# Patient Record
Sex: MALE | Race: WHITE | NOT HISPANIC OR LATINO | Employment: FULL TIME | URBAN - METROPOLITAN AREA
[De-identification: names, ages, dates, MRNs, and addresses within clinical notes are randomized per-mention and may not be internally consistent; named-entity substitution may affect disease eponyms.]

---

## 2017-09-20 ENCOUNTER — HISTORICAL (OUTPATIENT)
Dept: LAB | Facility: HOSPITAL | Age: 37
End: 2017-09-20

## 2017-09-20 LAB — RHEUMATOID FACT SERPL-ACNC: <10 IU/ML (ref 0–15)

## 2017-12-26 LAB — RAPID GROUP A STREP (OHS): POSITIVE

## 2018-03-20 ENCOUNTER — HISTORICAL (OUTPATIENT)
Dept: ADMINISTRATIVE | Facility: HOSPITAL | Age: 38
End: 2018-03-20

## 2018-03-20 LAB
ABS NEUT (OLG): 3.6
ALBUMIN SERPL-MCNC: 5 GM/DL (ref 3.4–5)
ALBUMIN/GLOB SERPL: 2.19 {RATIO} (ref 1.5–2.5)
ALP SERPL-CCNC: 60 UNIT/L (ref 38–126)
ALT SERPL-CCNC: 53 UNIT/L (ref 7–52)
APPEARANCE, UA: CLEAR
AST SERPL-CCNC: 27 UNIT/L (ref 15–37)
BACTERIA #/AREA URNS AUTO: ABNORMAL /HPF
BILIRUB SERPL-MCNC: 0.9 MG/DL (ref 0.2–1)
BILIRUB UR QL STRIP: NEGATIVE MG/DL
BILIRUBIN DIRECT+TOT PNL SERPL-MCNC: 0.2 MG/DL (ref 0–0.5)
BUN SERPL-MCNC: 14 MG/DL (ref 7–18)
CALCIUM SERPL-MCNC: 9.6 MG/DL (ref 8.5–10)
CHLORIDE SERPL-SCNC: 105 MMOL/L (ref 98–107)
CHOLEST SERPL-MCNC: 185 MG/DL (ref 0–200)
CHOLEST/HDLC SERPL: 4.9 {RATIO}
CO2 SERPL-SCNC: 28 MMOL/L (ref 21–32)
COLOR UR: YELLOW
CREAT SERPL-MCNC: 0.87 MG/DL (ref 0.6–1.3)
CREAT/UREA NIT SERPL: 16.1
ERYTHROCYTE [DISTWIDTH] IN BLOOD BY AUTOMATED COUNT: 12.5 % (ref 11.5–17)
GGT SERPL-CCNC: 98 UNIT/L (ref 5–85)
GLOBULIN SER-MCNC: 2.3 GM/DL (ref 1.2–3)
GLUCOSE (UA): NEGATIVE MG/DL
GLUCOSE SERPL-MCNC: 93 MG/DL (ref 74–106)
HCT VFR BLD AUTO: 43.8 % (ref 42–52)
HDLC SERPL-MCNC: 38 MG/DL (ref 35–60)
HGB BLD-MCNC: 15.2 GM/DL (ref 14–18)
HGB UR QL STRIP: NEGATIVE UNIT/L
KETONES UR QL STRIP: NEGATIVE MG/DL
LDH SERPL-CCNC: 172 UNIT/L (ref 140–271)
LDLC SERPL CALC-MCNC: 112 MG/DL (ref 0–129)
LEUKOCYTE ESTERASE UR QL STRIP: NEGATIVE UNIT/L
LYMPHOCYTES # BLD AUTO: 2.7 X10(3)/MCL (ref 0.6–3.4)
LYMPHOCYTES NFR BLD AUTO: 38.8 % (ref 13–40)
MCH RBC QN AUTO: 30.5 PG (ref 27–31.2)
MCHC RBC AUTO-ENTMCNC: 35 GM/DL (ref 32–36)
MCV RBC AUTO: 88 FL (ref 80–94)
MONOCYTES # BLD AUTO: 0.6 X10(3)/MCL (ref 0–1.8)
MONOCYTES NFR BLD AUTO: 8.3 % (ref 0.1–24)
NEUTROPHILS NFR BLD AUTO: 52.9 % (ref 47–80)
NITRITE UR QL STRIP.AUTO: NEGATIVE
PH UR STRIP: 6 [PH]
PLATELET # BLD AUTO: 294 X10(3)/MCL (ref 130–400)
PMV BLD AUTO: 9.3 FL
POTASSIUM SERPL-SCNC: 4.9 MMOL/L (ref 3.5–5.1)
PROT SERPL-MCNC: 7.3 GM/DL (ref 6.4–8.2)
PROT UR QL STRIP: NEGATIVE MG/DL
RBC # BLD AUTO: 4.99 X10(6)/MCL (ref 4.7–6.1)
RBC #/AREA URNS HPF: ABNORMAL /HPF
SODIUM SERPL-SCNC: 139 MMOL/L (ref 136–145)
SP GR UR STRIP: >1.03
SQUAMOUS EPITHELIAL, UA: ABNORMAL /LPF
TRIGL SERPL-MCNC: 172 MG/DL (ref 30–150)
UROBILINOGEN UR STRIP-ACNC: 0.2 MG/DL
VLDLC SERPL CALC-MCNC: 34.4 MG/DL
WBC # SPEC AUTO: 6.9 X10(3)/MCL (ref 4.5–11.5)
WBC #/AREA URNS AUTO: ABNORMAL /[HPF]

## 2018-09-25 ENCOUNTER — HISTORICAL (OUTPATIENT)
Dept: ADMINISTRATIVE | Facility: HOSPITAL | Age: 38
End: 2018-09-25

## 2018-09-25 LAB
ABS NEUT (OLG): 3.6
ALBUMIN SERPL-MCNC: 4.6 GM/DL (ref 3.4–5)
ALBUMIN/GLOB SERPL: 1.84 {RATIO} (ref 1.5–2.5)
ALP SERPL-CCNC: 53 UNIT/L (ref 38–126)
ALT SERPL-CCNC: 41 UNIT/L (ref 7–52)
AST SERPL-CCNC: 18 UNIT/L (ref 15–37)
BILIRUB SERPL-MCNC: 0.4 MG/DL (ref 0.2–1)
BILIRUBIN DIRECT+TOT PNL SERPL-MCNC: 0.1 MG/DL (ref 0–0.5)
BILIRUBIN DIRECT+TOT PNL SERPL-MCNC: 0.3 MG/DL
BUN SERPL-MCNC: 16 MG/DL (ref 7–18)
CALCIUM SERPL-MCNC: 9.3 MG/DL (ref 8.5–10)
CHLORIDE SERPL-SCNC: 107 MMOL/L (ref 98–107)
CHOLEST SERPL-MCNC: 171 MG/DL (ref 0–200)
CHOLEST/HDLC SERPL: 4.8 {RATIO}
CO2 SERPL-SCNC: 33 MMOL/L (ref 21–32)
CREAT SERPL-MCNC: 0.88 MG/DL (ref 0.6–1.3)
ERYTHROCYTE [DISTWIDTH] IN BLOOD BY AUTOMATED COUNT: 12.2 % (ref 11.5–17)
GLOBULIN SER-MCNC: 2.5 GM/DL (ref 1.2–3)
GLUCOSE SERPL-MCNC: 105 MG/DL (ref 74–106)
HCT VFR BLD AUTO: 44.8 % (ref 42–52)
HDLC SERPL-MCNC: 36 MG/DL (ref 35–60)
HGB BLD-MCNC: 14.8 GM/DL (ref 14–18)
LDLC SERPL CALC-MCNC: 100 MG/DL (ref 0–129)
LYMPHOCYTES # BLD AUTO: 2.3 X10(3)/MCL (ref 0.6–3.4)
LYMPHOCYTES NFR BLD AUTO: 34.9 % (ref 13–40)
MCH RBC QN AUTO: 30.2 PG (ref 27–31.2)
MCHC RBC AUTO-ENTMCNC: 33 GM/DL (ref 32–36)
MCV RBC AUTO: 91 FL (ref 80–94)
MONOCYTES # BLD AUTO: 0.6 X10(3)/MCL (ref 0–1.8)
MONOCYTES NFR BLD AUTO: 8.5 % (ref 0.1–24)
NEUTROPHILS NFR BLD AUTO: 56.6 % (ref 47–80)
PLATELET # BLD AUTO: 317 X10(3)/MCL (ref 130–400)
PMV BLD AUTO: 9.3 FL
POTASSIUM SERPL-SCNC: 4.9 MMOL/L (ref 3.5–5.1)
PROT SERPL-MCNC: 7.1 GM/DL (ref 6.4–8.2)
RBC # BLD AUTO: 4.9 X10(6)/MCL (ref 4.7–6.1)
SODIUM SERPL-SCNC: 142 MMOL/L (ref 136–145)
TRIGL SERPL-MCNC: 112 MG/DL (ref 30–150)
VLDLC SERPL CALC-MCNC: 22.4 MG/DL
WBC # SPEC AUTO: 6.5 X10(3)/MCL (ref 4.5–11.5)

## 2018-10-11 ENCOUNTER — HISTORICAL (OUTPATIENT)
Dept: ADMINISTRATIVE | Facility: HOSPITAL | Age: 38
End: 2018-10-11

## 2018-10-11 LAB
TESTOST SERPL-MCNC: 293 NG/DL (ref 300–1060)
TSH SERPL-ACNC: 0.63 MIU/ML (ref 0.35–4.94)

## 2019-05-07 ENCOUNTER — HISTORICAL (OUTPATIENT)
Dept: ADMINISTRATIVE | Facility: HOSPITAL | Age: 39
End: 2019-05-07

## 2019-05-07 LAB
ABS NEUT (OLG): 4.1 X10(3)/MCL (ref 2.1–9.2)
ALBUMIN SERPL-MCNC: 4.5 GM/DL (ref 3.4–5)
ALBUMIN/GLOB SERPL: 1.8 {RATIO} (ref 1.5–2.5)
ALP SERPL-CCNC: 68 UNIT/L (ref 38–126)
ALT SERPL-CCNC: 42 UNIT/L (ref 7–52)
APPEARANCE, UA: CLEAR
AST SERPL-CCNC: 20 UNIT/L (ref 15–37)
BACTERIA #/AREA URNS AUTO: ABNORMAL /HPF
BILIRUB SERPL-MCNC: 0.6 MG/DL (ref 0.2–1)
BILIRUB UR QL STRIP: NEGATIVE MG/DL
BILIRUBIN DIRECT+TOT PNL SERPL-MCNC: 0.1 MG/DL (ref 0–0.5)
BILIRUBIN DIRECT+TOT PNL SERPL-MCNC: 0.5 MG/DL
BUN SERPL-MCNC: 14 MG/DL (ref 7–18)
CALCIUM SERPL-MCNC: 9.2 MG/DL (ref 8.5–10)
CHLORIDE SERPL-SCNC: 106 MMOL/L (ref 98–107)
CHOLEST SERPL-MCNC: 173 MG/DL (ref 0–200)
CHOLEST/HDLC SERPL: 4.4 {RATIO}
CO2 SERPL-SCNC: 28 MMOL/L (ref 21–32)
COLOR UR: YELLOW
CREAT SERPL-MCNC: 0.89 MG/DL (ref 0.6–1.3)
ERYTHROCYTE [DISTWIDTH] IN BLOOD BY AUTOMATED COUNT: 12.1 % (ref 11.5–17)
GLOBULIN SER-MCNC: 2.5 GM/DL (ref 1.2–3)
GLUCOSE (UA): NEGATIVE MG/DL
GLUCOSE SERPL-MCNC: 105 MG/DL (ref 74–106)
HCT VFR BLD AUTO: 42.8 % (ref 42–52)
HDLC SERPL-MCNC: 39 MG/DL (ref 35–60)
HGB BLD-MCNC: 15.1 GM/DL (ref 14–18)
HGB UR QL STRIP: NEGATIVE UNIT/L
KETONES UR QL STRIP: NEGATIVE MG/DL
LDLC SERPL CALC-MCNC: 105 MG/DL (ref 0–129)
LEUKOCYTE ESTERASE UR QL STRIP: NEGATIVE UNIT/L
LYMPHOCYTES # BLD AUTO: 2.6 X10(3)/MCL (ref 0.6–3.4)
LYMPHOCYTES NFR BLD AUTO: 36.4 % (ref 13–40)
MCH RBC QN AUTO: 30.8 PG (ref 27–31.2)
MCHC RBC AUTO-ENTMCNC: 35 GM/DL (ref 32–36)
MCV RBC AUTO: 87 FL (ref 80–94)
MONOCYTES # BLD AUTO: 0.5 X10(3)/MCL (ref 0.1–1.3)
MONOCYTES NFR BLD AUTO: 6.5 % (ref 0.1–24)
NEUTROPHILS NFR BLD AUTO: 57.1 % (ref 47–80)
NITRITE UR QL STRIP.AUTO: NEGATIVE
PH UR STRIP: 5.5 [PH]
PLATELET # BLD AUTO: 291 X10(3)/MCL (ref 130–400)
PMV BLD AUTO: 9.5 FL (ref 9.4–12.4)
POTASSIUM SERPL-SCNC: 4.4 MMOL/L (ref 3.5–5.1)
PROT SERPL-MCNC: 7 GM/DL (ref 6.4–8.2)
PROT UR QL STRIP: NEGATIVE MG/DL
RBC # BLD AUTO: 4.91 X10(6)/MCL (ref 4.7–6.1)
RBC #/AREA URNS HPF: ABNORMAL /HPF
SODIUM SERPL-SCNC: 139 MMOL/L (ref 136–145)
SP GR UR STRIP: >1.03
SQUAMOUS EPITHELIAL, UA: ABNORMAL /LPF
TRIGL SERPL-MCNC: 126 MG/DL (ref 30–150)
TSH SERPL-ACNC: 0.93 MIU/ML (ref 0.35–4.94)
UROBILINOGEN UR STRIP-ACNC: 0.2 MG/DL
VLDLC SERPL CALC-MCNC: 25.2 MG/DL
WBC # SPEC AUTO: 7.2 X10(3)/MCL (ref 4.5–11.5)
WBC #/AREA URNS AUTO: ABNORMAL /[HPF]

## 2019-05-08 ENCOUNTER — HISTORICAL (OUTPATIENT)
Dept: ADMINISTRATIVE | Facility: HOSPITAL | Age: 39
End: 2019-05-08

## 2019-05-08 LAB — TESTOST SERPL-MCNC: 382 NG/DL (ref 300–1060)

## 2019-12-12 ENCOUNTER — HISTORICAL (OUTPATIENT)
Dept: ADMINISTRATIVE | Facility: HOSPITAL | Age: 39
End: 2019-12-12

## 2019-12-12 LAB
ALBUMIN SERPL-MCNC: 4.8 GM/DL (ref 3.4–5)
ALBUMIN/GLOB SERPL: 1.66 {RATIO} (ref 1.5–2.5)
ALP SERPL-CCNC: 58 UNIT/L (ref 38–126)
ALT SERPL-CCNC: 38 UNIT/L (ref 7–52)
AST SERPL-CCNC: 20 UNIT/L (ref 15–37)
BILIRUB SERPL-MCNC: 0.7 MG/DL (ref 0.2–1)
BILIRUBIN DIRECT+TOT PNL SERPL-MCNC: 0.1 MG/DL (ref 0–0.5)
BILIRUBIN DIRECT+TOT PNL SERPL-MCNC: 0.6 MG/DL
BUN SERPL-MCNC: 17 MG/DL (ref 7–18)
CALCIUM SERPL-MCNC: 9.3 MG/DL (ref 8.5–10)
CHLORIDE SERPL-SCNC: 103 MMOL/L (ref 98–107)
CHOLEST SERPL-MCNC: 181 MG/DL (ref 0–200)
CHOLEST/HDLC SERPL: 4.8 {RATIO}
CO2 SERPL-SCNC: 28 MMOL/L (ref 21–32)
CREAT SERPL-MCNC: 0.8 MG/DL (ref 0.6–1.3)
GLOBULIN SER-MCNC: 2.9 GM/DL (ref 1.2–3)
GLUCOSE SERPL-MCNC: 92 MG/DL (ref 74–106)
HDLC SERPL-MCNC: 38 MG/DL (ref 35–60)
LDLC SERPL CALC-MCNC: 104 MG/DL (ref 0–129)
POTASSIUM SERPL-SCNC: 4.6 MMOL/L (ref 3.5–5.1)
PROT SERPL-MCNC: 7.7 GM/DL (ref 6.4–8.2)
SODIUM SERPL-SCNC: 139 MMOL/L (ref 136–145)
TESTOST SERPL-MCNC: 460 NG/DL (ref 300–1060)
TRIGL SERPL-MCNC: 183 MG/DL (ref 30–150)
VLDLC SERPL CALC-MCNC: 36.6 MG/DL

## 2020-06-12 ENCOUNTER — HISTORICAL (OUTPATIENT)
Dept: ADMINISTRATIVE | Facility: HOSPITAL | Age: 40
End: 2020-06-12

## 2020-06-12 LAB
ABS NEUT (OLG): 4.9 X10(3)/MCL (ref 2.1–9.2)
ALBUMIN SERPL-MCNC: 4.7 GM/DL (ref 3.4–5)
ALBUMIN/GLOB SERPL: 2.35 {RATIO} (ref 1.5–2.5)
ALP SERPL-CCNC: 58 UNIT/L (ref 38–126)
ALT SERPL-CCNC: 37 UNIT/L (ref 7–52)
APPEARANCE, UA: ABNORMAL
AST SERPL-CCNC: 20 UNIT/L (ref 15–37)
BACTERIA #/AREA URNS AUTO: ABNORMAL /HPF
BILIRUB SERPL-MCNC: 0.8 MG/DL (ref 0.2–1)
BILIRUB UR QL STRIP: NEGATIVE MG/DL
BILIRUBIN DIRECT+TOT PNL SERPL-MCNC: 0.2 MG/DL (ref 0–0.5)
BILIRUBIN DIRECT+TOT PNL SERPL-MCNC: 0.6 MG/DL
BUN SERPL-MCNC: 18 MG/DL (ref 7–18)
CALCIUM SERPL-MCNC: 9.6 MG/DL (ref 8.5–10)
CHLORIDE SERPL-SCNC: 105 MMOL/L (ref 98–107)
CHOLEST SERPL-MCNC: 172 MG/DL (ref 0–200)
CHOLEST/HDLC SERPL: 4.9 {RATIO}
CO2 SERPL-SCNC: 31 MMOL/L (ref 21–32)
COLOR UR: YELLOW
CREAT SERPL-MCNC: 0.89 MG/DL (ref 0.6–1.3)
ERYTHROCYTE [DISTWIDTH] IN BLOOD BY AUTOMATED COUNT: 12.4 % (ref 11.5–17)
GLOBULIN SER-MCNC: 2 GM/DL (ref 1.2–3)
GLUCOSE (UA): NEGATIVE MG/DL
GLUCOSE SERPL-MCNC: 86 MG/DL (ref 74–106)
HCT VFR BLD AUTO: 43.6 % (ref 42–52)
HDLC SERPL-MCNC: 35 MG/DL (ref 35–60)
HGB BLD-MCNC: 14.9 GM/DL (ref 14–18)
HGB UR QL STRIP: NEGATIVE UNIT/L
KETONES UR QL STRIP: NEGATIVE MG/DL
LDLC SERPL CALC-MCNC: 98 MG/DL (ref 0–129)
LEUKOCYTE ESTERASE UR QL STRIP: NEGATIVE UNIT/L
LYMPHOCYTES # BLD AUTO: 2.7 X10(3)/MCL (ref 0.6–3.4)
LYMPHOCYTES NFR BLD AUTO: 33.1 % (ref 13–40)
MCH RBC QN AUTO: 30 PG (ref 27–31.2)
MCHC RBC AUTO-ENTMCNC: 34 GM/DL (ref 32–36)
MCV RBC AUTO: 88 FL (ref 80–94)
MONOCYTES # BLD AUTO: 0.6 X10(3)/MCL (ref 0.1–1.3)
MONOCYTES NFR BLD AUTO: 7.9 % (ref 0.1–24)
NEUTROPHILS NFR BLD AUTO: 59 % (ref 47–80)
NITRITE UR QL STRIP.AUTO: NEGATIVE
PH UR STRIP: 5 [PH]
PLATELET # BLD AUTO: 306 X10(3)/MCL (ref 130–400)
PMV BLD AUTO: 9.2 FL (ref 9.4–12.4)
POTASSIUM SERPL-SCNC: 4.8 MMOL/L (ref 3.5–5.1)
PROT SERPL-MCNC: 6.7 GM/DL (ref 6.4–8.2)
PROT UR QL STRIP: NEGATIVE MG/DL
RBC # BLD AUTO: 4.97 X10(6)/MCL (ref 4.7–6.1)
RBC #/AREA URNS HPF: ABNORMAL /HPF
SODIUM SERPL-SCNC: 142 MMOL/L (ref 136–145)
SP GR UR STRIP: >1.03
SQUAMOUS EPITHELIAL, UA: ABNORMAL /LPF
TESTOST SERPL-MCNC: 377 NG/DL (ref 300–1060)
TRIGL SERPL-MCNC: 163 MG/DL (ref 30–150)
TSH SERPL-ACNC: 0.51 MIU/ML (ref 0.35–4.94)
UROBILINOGEN UR STRIP-ACNC: 0.2 MG/DL
VLDLC SERPL CALC-MCNC: 32.6 MG/DL
WBC # SPEC AUTO: 8.2 X10(3)/MCL (ref 4.5–11.5)
WBC #/AREA URNS AUTO: ABNORMAL /[HPF]

## 2020-12-16 ENCOUNTER — HISTORICAL (OUTPATIENT)
Dept: ADMINISTRATIVE | Facility: HOSPITAL | Age: 40
End: 2020-12-16

## 2020-12-16 LAB
ALBUMIN SERPL-MCNC: 4.6 GM/DL (ref 3.4–5)
ALBUMIN/GLOB SERPL: 2.42 {RATIO} (ref 1.5–2.5)
ALP SERPL-CCNC: 64 UNIT/L (ref 38–126)
ALT SERPL-CCNC: 31 UNIT/L (ref 7–52)
AST SERPL-CCNC: 18 UNIT/L (ref 15–37)
BILIRUB SERPL-MCNC: 0.8 MG/DL (ref 0.2–1)
BILIRUBIN DIRECT+TOT PNL SERPL-MCNC: 0.2 MG/DL (ref 0–0.5)
BILIRUBIN DIRECT+TOT PNL SERPL-MCNC: 0.6 MG/DL
BUN SERPL-MCNC: 15 MG/DL (ref 7–18)
CALCIUM SERPL-MCNC: 9.3 MG/DL (ref 8.5–10.1)
CHLORIDE SERPL-SCNC: 106 MMOL/L (ref 98–107)
CHOLEST SERPL-MCNC: 145 MG/DL (ref 0–200)
CHOLEST/HDLC SERPL: 4 {RATIO}
CO2 SERPL-SCNC: 30 MMOL/L (ref 21–32)
CREAT SERPL-MCNC: 0.91 MG/DL (ref 0.6–1.3)
GLOBULIN SER-MCNC: 1.9 GM/DL (ref 1.2–3)
GLUCOSE SERPL-MCNC: 96 MG/DL (ref 74–106)
HDLC SERPL-MCNC: 36 MG/DL (ref 35–60)
LDLC SERPL CALC-MCNC: 100 MG/DL (ref 0–129)
POTASSIUM SERPL-SCNC: 4.6 MMOL/L (ref 3.5–5.1)
PROT SERPL-MCNC: 6.5 GM/DL (ref 6.4–8.2)
SODIUM SERPL-SCNC: 142 MMOL/L (ref 136–145)
TESTOST SERPL-MCNC: 472 NG/DL (ref 300–1060)
TRIGL SERPL-MCNC: 129 MG/DL (ref 30–150)
VLDLC SERPL CALC-MCNC: 25.8 MG/DL

## 2021-06-23 ENCOUNTER — HISTORICAL (OUTPATIENT)
Dept: ADMINISTRATIVE | Facility: HOSPITAL | Age: 41
End: 2021-06-23

## 2021-06-23 LAB
ABS NEUT (OLG): 3.6 X10(3)/MCL (ref 2.1–9.2)
ALBUMIN SERPL-MCNC: 4.8 GM/DL (ref 3.4–5)
ALBUMIN/GLOB SERPL: 2.53 {RATIO} (ref 1.5–2.5)
ALP SERPL-CCNC: 60 UNIT/L (ref 38–126)
ALT SERPL-CCNC: 36 UNIT/L (ref 7–52)
APPEARANCE, UA: CLEAR
AST SERPL-CCNC: 20 UNIT/L (ref 15–37)
BACTERIA #/AREA URNS AUTO: NORMAL /HPF
BILIRUB SERPL-MCNC: 0.9 MG/DL (ref 0.2–1)
BILIRUB UR QL STRIP: NEGATIVE MG/DL
BILIRUBIN DIRECT+TOT PNL SERPL-MCNC: 0.2 MG/DL (ref 0–0.5)
BILIRUBIN DIRECT+TOT PNL SERPL-MCNC: 0.7 MG/DL
BUN SERPL-MCNC: 13 MG/DL (ref 7–18)
CALCIUM SERPL-MCNC: 9.6 MG/DL (ref 8.5–10.1)
CHLORIDE SERPL-SCNC: 105 MMOL/L (ref 98–107)
CHOLEST SERPL-MCNC: 178 MG/DL (ref 0–200)
CHOLEST/HDLC SERPL: 4.8 {RATIO}
CO2 SERPL-SCNC: 30 MMOL/L (ref 21–32)
COLOR UR: YELLOW
CREAT SERPL-MCNC: 0.87 MG/DL (ref 0.6–1.3)
DEPRECATED CALCIDIOL+CALCIFEROL SERPL-MC: 13.2 NG/ML (ref 30–80)
ERYTHROCYTE [DISTWIDTH] IN BLOOD BY AUTOMATED COUNT: 12.3 % (ref 11.5–17)
GLOBULIN SER-MCNC: 1.9 GM/DL (ref 1.2–3)
GLUCOSE (UA): NEGATIVE MG/DL
GLUCOSE SERPL-MCNC: 91 MG/DL (ref 74–106)
HCT VFR BLD AUTO: 42.9 % (ref 42–52)
HDLC SERPL-MCNC: 37 MG/DL (ref 35–60)
HGB BLD-MCNC: 15 GM/DL (ref 14–18)
HGB UR QL STRIP: NEGATIVE UNIT/L
KETONES UR QL STRIP: NEGATIVE MG/DL
LDLC SERPL CALC-MCNC: 119 MG/DL (ref 0–129)
LEUKOCYTE ESTERASE UR QL STRIP: NEGATIVE UNIT/L
LYMPHOCYTES # BLD AUTO: 2.4 X10(3)/MCL (ref 0.6–3.4)
LYMPHOCYTES NFR BLD AUTO: 37.2 % (ref 13–40)
MCH RBC QN AUTO: 30.4 PG (ref 27–31.2)
MCHC RBC AUTO-ENTMCNC: 35 GM/DL (ref 32–36)
MCV RBC AUTO: 87 FL (ref 80–94)
MONOCYTES # BLD AUTO: 0.5 X10(3)/MCL (ref 0.1–1.3)
MONOCYTES NFR BLD AUTO: 7 % (ref 0.1–24)
NEUTROPHILS NFR BLD AUTO: 55.8 % (ref 47–80)
NITRITE UR QL STRIP.AUTO: NEGATIVE
PH UR STRIP: 5.5 [PH]
PLATELET # BLD AUTO: 321 X10(3)/MCL (ref 130–400)
PMV BLD AUTO: 9.4 FL (ref 9.4–12.4)
POTASSIUM SERPL-SCNC: 4.3 MMOL/L (ref 3.5–5.1)
PROT SERPL-MCNC: 6.7 GM/DL (ref 6.4–8.2)
PROT UR QL STRIP: NEGATIVE MG/DL
PSA SERPL-MCNC: 0.64 NG/ML (ref 0–2.5)
RBC # BLD AUTO: 4.93 X10(6)/MCL (ref 4.7–6.1)
RBC #/AREA URNS HPF: NORMAL /HPF
SODIUM SERPL-SCNC: 143 MMOL/L (ref 136–145)
SP GR UR STRIP: 1.02
SQUAMOUS EPITHELIAL, UA: NORMAL /LPF
TRIGL SERPL-MCNC: 148 MG/DL (ref 30–150)
TSH SERPL-ACNC: 0.86 MIU/ML (ref 0.35–4.94)
UROBILINOGEN UR STRIP-ACNC: 0.2 MG/DL
VLDLC SERPL CALC-MCNC: 29.6 MG/DL
WBC # SPEC AUTO: 6.5 X10(3)/MCL (ref 4.5–11.5)
WBC #/AREA URNS AUTO: NORMAL /[HPF]

## 2021-12-22 ENCOUNTER — HISTORICAL (OUTPATIENT)
Dept: ADMINISTRATIVE | Facility: HOSPITAL | Age: 41
End: 2021-12-22

## 2021-12-22 LAB
ABS NEUT (OLG): 3.7 X10(3)/MCL (ref 2.1–9.2)
ALBUMIN SERPL-MCNC: 4.7 GM/DL (ref 3.4–5)
ALBUMIN/GLOB SERPL: 2.47 {RATIO} (ref 1.5–2.5)
ALP SERPL-CCNC: 58 UNIT/L (ref 38–126)
ALT SERPL-CCNC: 38 UNIT/L (ref 7–52)
AST SERPL-CCNC: 19 UNIT/L (ref 15–37)
BILIRUB SERPL-MCNC: 0.7 MG/DL (ref 0.2–1)
BILIRUBIN DIRECT+TOT PNL SERPL-MCNC: 0.2 MG/DL (ref 0–0.5)
BILIRUBIN DIRECT+TOT PNL SERPL-MCNC: 0.5 MG/DL
BUN SERPL-MCNC: 14 MG/DL (ref 7–18)
CALCIUM SERPL-MCNC: 9.5 MG/DL (ref 8.5–10.1)
CHLORIDE SERPL-SCNC: 102 MMOL/L (ref 98–107)
CHOLEST SERPL-MCNC: 157 MG/DL (ref 0–200)
CHOLEST/HDLC SERPL: 4 {RATIO}
CO2 SERPL-SCNC: 29 MMOL/L (ref 21–32)
CREAT SERPL-MCNC: 0.93 MG/DL (ref 0.6–1.3)
DEPRECATED CALCIDIOL+CALCIFEROL SERPL-MC: 20.6 NG/ML (ref 30–80)
ERYTHROCYTE [DISTWIDTH] IN BLOOD BY AUTOMATED COUNT: 12.2 % (ref 11.5–17)
GLOBULIN SER-MCNC: 1.9 GM/DL (ref 1.2–3)
GLUCOSE SERPL-MCNC: 91 MG/DL (ref 74–106)
HCT VFR BLD AUTO: 43 % (ref 42–52)
HDLC SERPL-MCNC: 39 MG/DL (ref 35–60)
HGB BLD-MCNC: 14.6 GM/DL (ref 14–18)
LDLC SERPL CALC-MCNC: 95 MG/DL (ref 0–129)
LYMPHOCYTES # BLD AUTO: 2.5 X10(3)/MCL (ref 0.6–3.4)
LYMPHOCYTES NFR BLD AUTO: 36.9 % (ref 13–40)
MCH RBC QN AUTO: 29.1 PG (ref 27–31.2)
MCHC RBC AUTO-ENTMCNC: 34 GM/DL (ref 32–36)
MCV RBC AUTO: 86 FL (ref 80–94)
MONOCYTES # BLD AUTO: 0.6 X10(3)/MCL (ref 0.1–1.3)
MONOCYTES NFR BLD AUTO: 8.8 % (ref 0.1–24)
NEUTROPHILS NFR BLD AUTO: 54.3 % (ref 47–80)
PLATELET # BLD AUTO: 309 X10(3)/MCL (ref 130–400)
PMV BLD AUTO: 9.4 FL (ref 9.4–12.4)
POTASSIUM SERPL-SCNC: 4.6 MMOL/L (ref 3.5–5.1)
PROT SERPL-MCNC: 6.6 GM/DL (ref 6.4–8.2)
RBC # BLD AUTO: 5.02 X10(6)/MCL (ref 4.7–6.1)
SODIUM SERPL-SCNC: 139 MMOL/L (ref 136–145)
TESTOST SERPL-MCNC: 512 NG/DL (ref 300–1060)
TRIGL SERPL-MCNC: 112 MG/DL (ref 30–150)
TSH SERPL-ACNC: 0.8 MIU/ML (ref 0.35–4.94)
VLDLC SERPL CALC-MCNC: 22.4 MG/DL
WBC # SPEC AUTO: 6.8 X10(3)/MCL (ref 4.5–11.5)

## 2022-01-26 LAB
RAPID GROUP A STREP (OHS): NEGATIVE
SARS-COV-2 RNA RESP QL NAA+PROBE: NEGATIVE

## 2022-04-11 ENCOUNTER — HISTORICAL (OUTPATIENT)
Dept: ADMINISTRATIVE | Facility: HOSPITAL | Age: 42
End: 2022-04-11
Payer: COMMERCIAL

## 2022-04-25 VITALS
WEIGHT: 210.56 LBS | BODY MASS INDEX: 31.19 KG/M2 | OXYGEN SATURATION: 100 % | SYSTOLIC BLOOD PRESSURE: 153 MMHG | HEIGHT: 69 IN | DIASTOLIC BLOOD PRESSURE: 99 MMHG

## 2022-06-27 PROBLEM — G47.33 OBSTRUCTIVE SLEEP APNEA SYNDROME: Status: ACTIVE | Noted: 2022-06-27

## 2022-06-27 PROBLEM — I10 HYPERTENSION: Status: ACTIVE | Noted: 2022-06-27

## 2022-06-27 PROBLEM — R79.89 DECREASED TESTOSTERONE LEVEL: Status: ACTIVE | Noted: 2022-06-27

## 2022-06-27 PROBLEM — R53.83 FATIGUE: Status: ACTIVE | Noted: 2022-06-27

## 2022-06-27 PROBLEM — F32.A DEPRESSIVE DISORDER: Status: ACTIVE | Noted: 2022-06-27

## 2022-06-27 PROBLEM — E55.9 VITAMIN D DEFICIENCY: Status: ACTIVE | Noted: 2022-06-27

## 2022-06-27 PROBLEM — E78.00 HYPERCHOLESTEROLEMIA: Status: ACTIVE | Noted: 2022-06-27

## 2022-06-27 PROBLEM — E66.9 OBESITY: Status: ACTIVE | Noted: 2022-06-27

## 2022-06-27 PROBLEM — N52.9 ED (ERECTILE DYSFUNCTION): Status: ACTIVE | Noted: 2022-06-27

## 2022-08-15 ENCOUNTER — OFFICE VISIT (OUTPATIENT)
Dept: URGENT CARE | Facility: CLINIC | Age: 42
End: 2022-08-15
Payer: COMMERCIAL

## 2022-08-15 VITALS
HEIGHT: 70 IN | OXYGEN SATURATION: 98 % | DIASTOLIC BLOOD PRESSURE: 92 MMHG | HEART RATE: 105 BPM | SYSTOLIC BLOOD PRESSURE: 151 MMHG | BODY MASS INDEX: 30.06 KG/M2 | TEMPERATURE: 99 F | RESPIRATION RATE: 18 BRPM | WEIGHT: 210 LBS

## 2022-08-15 DIAGNOSIS — R50.9 FEVER, UNSPECIFIED FEVER CAUSE: Primary | ICD-10-CM

## 2022-08-15 DIAGNOSIS — U07.1 COVID-19 VIRUS DETECTED: ICD-10-CM

## 2022-08-15 DIAGNOSIS — U07.1 COVID-19: ICD-10-CM

## 2022-08-15 LAB
CTP QC/QA: YES
SARS-COV-2 RDRP RESP QL NAA+PROBE: POSITIVE

## 2022-08-15 PROCEDURE — 1159F MED LIST DOCD IN RCRD: CPT | Mod: CPTII,,, | Performed by: PHYSICIAN ASSISTANT

## 2022-08-15 PROCEDURE — 3080F DIAST BP >= 90 MM HG: CPT | Mod: CPTII,,, | Performed by: PHYSICIAN ASSISTANT

## 2022-08-15 PROCEDURE — 3008F BODY MASS INDEX DOCD: CPT | Mod: CPTII,,, | Performed by: PHYSICIAN ASSISTANT

## 2022-08-15 PROCEDURE — 4010F PR ACE/ARB THEARPY RXD/TAKEN: ICD-10-PCS | Mod: CPTII,,, | Performed by: PHYSICIAN ASSISTANT

## 2022-08-15 PROCEDURE — 1160F PR REVIEW ALL MEDS BY PRESCRIBER/CLIN PHARMACIST DOCUMENTED: ICD-10-PCS | Mod: CPTII,,, | Performed by: PHYSICIAN ASSISTANT

## 2022-08-15 PROCEDURE — 4010F ACE/ARB THERAPY RXD/TAKEN: CPT | Mod: CPTII,,, | Performed by: PHYSICIAN ASSISTANT

## 2022-08-15 PROCEDURE — 1160F RVW MEDS BY RX/DR IN RCRD: CPT | Mod: CPTII,,, | Performed by: PHYSICIAN ASSISTANT

## 2022-08-15 PROCEDURE — 3077F PR MOST RECENT SYSTOLIC BLOOD PRESSURE >= 140 MM HG: ICD-10-PCS | Mod: CPTII,,, | Performed by: PHYSICIAN ASSISTANT

## 2022-08-15 PROCEDURE — 99213 OFFICE O/P EST LOW 20 MIN: CPT | Mod: ,,, | Performed by: PHYSICIAN ASSISTANT

## 2022-08-15 PROCEDURE — 3077F SYST BP >= 140 MM HG: CPT | Mod: CPTII,,, | Performed by: PHYSICIAN ASSISTANT

## 2022-08-15 PROCEDURE — 3008F PR BODY MASS INDEX (BMI) DOCUMENTED: ICD-10-PCS | Mod: CPTII,,, | Performed by: PHYSICIAN ASSISTANT

## 2022-08-15 PROCEDURE — U0002 COVID-19 LAB TEST NON-CDC: HCPCS | Mod: QW,,, | Performed by: PHYSICIAN ASSISTANT

## 2022-08-15 PROCEDURE — 3080F PR MOST RECENT DIASTOLIC BLOOD PRESSURE >= 90 MM HG: ICD-10-PCS | Mod: CPTII,,, | Performed by: PHYSICIAN ASSISTANT

## 2022-08-15 PROCEDURE — 1159F PR MEDICATION LIST DOCUMENTED IN MEDICAL RECORD: ICD-10-PCS | Mod: CPTII,,, | Performed by: PHYSICIAN ASSISTANT

## 2022-08-15 PROCEDURE — U0002: ICD-10-PCS | Mod: QW,,, | Performed by: PHYSICIAN ASSISTANT

## 2022-08-15 PROCEDURE — 99213 PR OFFICE/OUTPT VISIT, EST, LEVL III, 20-29 MIN: ICD-10-PCS | Mod: ,,, | Performed by: PHYSICIAN ASSISTANT

## 2022-09-23 ENCOUNTER — HISTORICAL (OUTPATIENT)
Dept: ADMINISTRATIVE | Facility: HOSPITAL | Age: 42
End: 2022-09-23
Payer: COMMERCIAL

## 2023-03-02 ENCOUNTER — OFFICE VISIT (OUTPATIENT)
Dept: URGENT CARE | Facility: CLINIC | Age: 43
End: 2023-03-02
Payer: COMMERCIAL

## 2023-03-02 VITALS
WEIGHT: 217 LBS | DIASTOLIC BLOOD PRESSURE: 97 MMHG | RESPIRATION RATE: 17 BRPM | HEIGHT: 70 IN | HEART RATE: 109 BPM | SYSTOLIC BLOOD PRESSURE: 163 MMHG | OXYGEN SATURATION: 98 % | TEMPERATURE: 99 F | BODY MASS INDEX: 31.07 KG/M2

## 2023-03-02 DIAGNOSIS — J32.9 SINUSITIS, UNSPECIFIED CHRONICITY, UNSPECIFIED LOCATION: ICD-10-CM

## 2023-03-02 DIAGNOSIS — R09.81 NASAL CONGESTION: Primary | ICD-10-CM

## 2023-03-02 LAB
CTP QC/QA: YES
SARS-COV-2 RDRP RESP QL NAA+PROBE: NEGATIVE

## 2023-03-02 PROCEDURE — 87635 SARS-COV-2 COVID-19 AMP PRB: CPT | Mod: QW,,,

## 2023-03-02 PROCEDURE — 1159F MED LIST DOCD IN RCRD: CPT | Mod: CPTII,,,

## 2023-03-02 PROCEDURE — 99213 OFFICE O/P EST LOW 20 MIN: CPT | Mod: S$PBB,25,,

## 2023-03-02 PROCEDURE — 1159F PR MEDICATION LIST DOCUMENTED IN MEDICAL RECORD: ICD-10-PCS | Mod: CPTII,,,

## 2023-03-02 PROCEDURE — 1160F PR REVIEW ALL MEDS BY PRESCRIBER/CLIN PHARMACIST DOCUMENTED: ICD-10-PCS | Mod: CPTII,,,

## 2023-03-02 PROCEDURE — 3008F PR BODY MASS INDEX (BMI) DOCUMENTED: ICD-10-PCS | Mod: CPTII,,,

## 2023-03-02 PROCEDURE — 96372 PR INJECTION,THERAP/PROPH/DIAG2ST, IM OR SUBCUT: ICD-10-PCS | Mod: ,,,

## 2023-03-02 PROCEDURE — 96372 THER/PROPH/DIAG INJ SC/IM: CPT | Mod: ,,,

## 2023-03-02 PROCEDURE — 3008F BODY MASS INDEX DOCD: CPT | Mod: CPTII,,,

## 2023-03-02 PROCEDURE — 87635: ICD-10-PCS | Mod: QW,,,

## 2023-03-02 PROCEDURE — 3077F PR MOST RECENT SYSTOLIC BLOOD PRESSURE >= 140 MM HG: ICD-10-PCS | Mod: CPTII,,,

## 2023-03-02 PROCEDURE — 3077F SYST BP >= 140 MM HG: CPT | Mod: CPTII,,,

## 2023-03-02 PROCEDURE — 1160F RVW MEDS BY RX/DR IN RCRD: CPT | Mod: CPTII,,,

## 2023-03-02 PROCEDURE — 3080F DIAST BP >= 90 MM HG: CPT | Mod: CPTII,,,

## 2023-03-02 PROCEDURE — 99213 PR OFFICE/OUTPT VISIT, EST, LEVL III, 20-29 MIN: ICD-10-PCS | Mod: S$PBB,25,,

## 2023-03-02 PROCEDURE — 3080F PR MOST RECENT DIASTOLIC BLOOD PRESSURE >= 90 MM HG: ICD-10-PCS | Mod: CPTII,,,

## 2023-03-02 RX ORDER — AMOXICILLIN AND CLAVULANATE POTASSIUM 875; 125 MG/1; MG/1
1 TABLET, FILM COATED ORAL EVERY 12 HOURS
Qty: 14 TABLET | Refills: 0 | Status: SHIPPED | OUTPATIENT
Start: 2023-03-02 | End: 2023-03-09

## 2023-03-02 RX ORDER — DEXAMETHASONE SODIUM PHOSPHATE 100 MG/10ML
10 INJECTION INTRAMUSCULAR; INTRAVENOUS
Status: COMPLETED | OUTPATIENT
Start: 2023-03-02 | End: 2023-03-02

## 2023-03-02 RX ADMIN — DEXAMETHASONE SODIUM PHOSPHATE 10 MG: 100 INJECTION INTRAMUSCULAR; INTRAVENOUS at 02:03

## 2023-03-02 NOTE — PATIENT INSTRUCTIONS
Covid negative     Start taking an allergy pill daily such as claritin, zyrtec, allegrea or xyzal. Also start using a nasal steroid spray such as flonase or nasacort daily. If you are not being treated for high blood pressure, you can also take decongestant such as sudafed as needed. You can take Coricidin HBP for congestion as needed. They can be purchased over the counter. Monitor for fever. Take tylenol/acetaminophen or ibuprofen as needed. Rest and hydrate. If symptoms persist or worsen, return to clinic or seek medical attention immediately.     As discussed this is most likely a viral sinusitis. If symptoms persist or worsen start the antibiotic. If you do start them, make sure to finish the entire course consider taking a probiotic.

## 2023-03-02 NOTE — PROGRESS NOTES
"Subjective:       Patient ID: Nilson Crews is a 42 y.o. male.    Vitals:  height is 5' 10" (1.778 m) and weight is 98.4 kg (217 lb). His temperature is 98.5 °F (36.9 °C). His blood pressure is 163/97 (abnormal) and his pulse is 109. His respiration is 17 and oxygen saturation is 98%.     Chief Complaint: Cough (42 y.o. male presents to the clinic w/ c/o/ of cough, sinus pressure, congestion, sore throat, watery eyes, headache x 4d. Taking OTC medicine to help with symptoms. )    A 42 y.o. male presents to the clinic w/ c/o/ of cough, sinus pressure, congestion, sore throat, watery eyes, headache x 4d. Taking OTC medicine to help with symptoms. He denies any history of asthma, wheezing, sob, cp, n/v/d, abdominal complaints, rash, difficulty swallowing, neck stiffness, or changes in intake or output.       Cough  Associated symptoms include a sore throat. Pertinent negatives include no fever, shortness of breath or wheezing.   Constitution: Negative for fatigue and fever.   HENT:  Positive for congestion, sinus pain, sinus pressure and sore throat. Negative for trouble swallowing.    Neck: neck negative.   Cardiovascular: Negative.    Eyes: Negative.    Respiratory:  Positive for cough. Negative for shortness of breath, wheezing and asthma.    Gastrointestinal: Negative.    Allergic/Immunologic: Negative for asthma.     Objective:      Physical Exam   Constitutional: He is oriented to person, place, and time. He appears well-developed. He is cooperative.  Non-toxic appearance. He does not appear ill. No distress.   HENT:   Head: Normocephalic and atraumatic.   Ears:   Right Ear: Hearing, tympanic membrane and external ear normal.   Left Ear: Hearing, tympanic membrane and external ear normal.   Nose: Congestion (clear pnd) present.   Mouth/Throat: Mucous membranes are normal. Mucous membranes are moist. Posterior oropharyngeal erythema present. Oropharynx is clear.   Eyes: Conjunctivae and lids are normal.   Neck: " Trachea normal and phonation normal. Neck supple. No edema present. No erythema present. No neck rigidity present.   Cardiovascular: Normal rate, regular rhythm and normal heart sounds.   Pulmonary/Chest: Effort normal and breath sounds normal. No stridor. No respiratory distress. He has no decreased breath sounds. He has no wheezes. He has no rhonchi. He has no rales.   Abdominal: Normal appearance.   Neurological: He is alert and oriented to person, place, and time. He exhibits normal muscle tone.   Skin: Skin is warm, intact and not diaphoretic. Capillary refill takes less than 2 seconds.   Psychiatric: His speech is normal and behavior is normal. Mood and thought content normal.   Nursing note and vitals reviewed.      Assessment:       1. Nasal congestion    2. Sinusitis, unspecified chronicity, unspecified location          Plan:         Nasal congestion  -     POCT COVID-19 Rapid Screening    Sinusitis, unspecified chronicity, unspecified location    Covid negative     Start taking an allergy pill daily such as claritin, zyrtec, allegrea or xyzal. Also start using a nasal steroid spray such as flonase or nasacort daily. If you are not being treated for high blood pressure, you can also take decongestant such as sudafed as needed. You can take Coricidin HBP for congestion as needed. They can be purchased over the counter. Monitor for fever. Take tylenol/acetaminophen or ibuprofen as needed. Rest and hydrate. If symptoms persist or worsen, return to clinic or seek medical attention immediately.     As discussed this is most likely a viral sinusitis. If symptoms persist or worsen start the antibiotic. If you do start them, make sure to finish the entire course consider taking a probiotic.

## 2023-11-04 ENCOUNTER — OFFICE VISIT (OUTPATIENT)
Dept: URGENT CARE | Facility: CLINIC | Age: 43
End: 2023-11-04
Payer: COMMERCIAL

## 2023-11-04 VITALS
TEMPERATURE: 98 F | HEART RATE: 108 BPM | BODY MASS INDEX: 30.49 KG/M2 | DIASTOLIC BLOOD PRESSURE: 79 MMHG | HEIGHT: 70 IN | WEIGHT: 213 LBS | RESPIRATION RATE: 18 BRPM | SYSTOLIC BLOOD PRESSURE: 147 MMHG | OXYGEN SATURATION: 100 %

## 2023-11-04 DIAGNOSIS — H92.09 OTALGIA, UNSPECIFIED LATERALITY: ICD-10-CM

## 2023-11-04 DIAGNOSIS — H69.90 DYSFUNCTION OF EUSTACHIAN TUBE, UNSPECIFIED LATERALITY: ICD-10-CM

## 2023-11-04 DIAGNOSIS — J02.9 SORE THROAT: Primary | ICD-10-CM

## 2023-11-04 PROCEDURE — 99213 PR OFFICE/OUTPT VISIT, EST, LEVL III, 20-29 MIN: ICD-10-PCS | Mod: 25,,, | Performed by: FAMILY MEDICINE

## 2023-11-04 PROCEDURE — 96372 THER/PROPH/DIAG INJ SC/IM: CPT | Mod: ,,, | Performed by: FAMILY MEDICINE

## 2023-11-04 PROCEDURE — 96372 PR INJECTION,THERAP/PROPH/DIAG2ST, IM OR SUBCUT: ICD-10-PCS | Mod: ,,, | Performed by: FAMILY MEDICINE

## 2023-11-04 PROCEDURE — 99213 OFFICE O/P EST LOW 20 MIN: CPT | Mod: 25,,, | Performed by: FAMILY MEDICINE

## 2023-11-04 RX ORDER — PREDNISONE 20 MG/1
40 TABLET ORAL DAILY
Qty: 8 TABLET | Refills: 0 | Status: SHIPPED | OUTPATIENT
Start: 2023-11-04 | End: 2023-11-08

## 2023-11-04 RX ORDER — AMOXICILLIN AND CLAVULANATE POTASSIUM 875; 125 MG/1; MG/1
1 TABLET, FILM COATED ORAL EVERY 12 HOURS
Qty: 14 TABLET | Refills: 0 | Status: SHIPPED | OUTPATIENT
Start: 2023-11-04 | End: 2023-11-11

## 2023-11-04 RX ORDER — DEXAMETHASONE SODIUM PHOSPHATE 100 MG/10ML
10 INJECTION INTRAMUSCULAR; INTRAVENOUS
Status: COMPLETED | OUTPATIENT
Start: 2023-11-04 | End: 2023-11-04

## 2023-11-04 RX ADMIN — DEXAMETHASONE SODIUM PHOSPHATE 10 MG: 100 INJECTION INTRAMUSCULAR; INTRAVENOUS at 01:11

## 2023-11-04 NOTE — PATIENT INSTRUCTIONS
Plan:   Medications sent to pharmacy  Start antibiotics today  Start oral steroids tomorrow  Start taking an allergy pill daily such as claritin, zyrtec, allegrea or xyzal. Also start using a nasal steroid spray such as flonase or nasacort daily.  If oral steroids were prescribed, start them tomorrow morning. Monitor for fever. Take tylenol/acetaminophen or ibuprofen as needed. Rest and hydrate. If symptoms persist or worsen, return to clinic or seek medical attention immediately.

## 2023-11-04 NOTE — PROGRESS NOTES
"Subjective:      Patient ID: Nilson Crews is a 43 y.o. male.    Vitals:  height is 5' 10" (1.778 m) and weight is 96.6 kg (213 lb). His temperature is 98.3 °F (36.8 °C). His blood pressure is 147/79 (abnormal) and his pulse is 108. His respiration is 18 and oxygen saturation is 100%.     Chief Complaint: Sore Throat    43 y.o. male presents to clinic w/ c/o congestion, R ear pain that worsens w/ talking and applying pressure, sore throat x2d. Declined covid and flu testing. Alleviating factors used include AlkaSeltzer w/ improvement. Denies fever, neck stiffness, wheezing, SOB, CP, N/V/D, abdominal pain and rash.    Sore Throat   Associated symptoms include ear pain.     Constitution: Negative.   HENT:  Positive for ear pain and sore throat.    Neck: neck negative.   Cardiovascular: Negative.    Eyes: Negative.    Respiratory: Negative.     Musculoskeletal: Negative.    Skin: Negative.    Allergic/Immunologic: Negative.    Neurological: Negative.    Hematologic/Lymphatic: Negative.       Objective:     Physical Exam   Constitutional: He is oriented to person, place, and time. He appears well-developed. He is cooperative.  Non-toxic appearance. He does not appear ill. No distress.   HENT:   Head: Normocephalic and atraumatic.   Ears:   Right Ear: Hearing and external ear normal. impacted cerumen (dullness of the right TM)  Left Ear: Hearing and external ear normal.   Mouth/Throat: Mucous membranes are normal. Posterior oropharyngeal erythema present. No oropharyngeal exudate.   Eyes: Conjunctivae and lids are normal.   Neck: Trachea normal and phonation normal. Neck supple. No edema present. No erythema present. No neck rigidity present.   Cardiovascular: Normal rate, regular rhythm and normal heart sounds.   Pulmonary/Chest: Effort normal and breath sounds normal. No stridor. No respiratory distress. He has no decreased breath sounds. He has no wheezes. He has no rhonchi. He has no rales.   Abdominal: Normal " appearance.   Lymphadenopathy:     He has no cervical adenopathy.   Neurological: He is alert and oriented to person, place, and time. He exhibits normal muscle tone.   Skin: Skin is warm, intact and not diaphoretic.   Psychiatric: His speech is normal and behavior is normal. Mood, judgment and thought content normal.   Nursing note and vitals reviewed.       Previous History      Review of patient's allergies indicates:  No Known Allergies    Past Medical History:   Diagnosis Date    Bipolar disorder, unspecified     Depression     Essential (primary) hypertension     Fatigue     Hypercholesterolemia     Low testosterone in male     Male erectile dysfunction, unspecified     Obesity, unspecified     Obstructive sleep apnea     Skin tag     Vitamin D deficiency      Current Outpatient Medications   Medication Instructions    amoxicillin-clavulanate 875-125mg (AUGMENTIN) 875-125 mg per tablet 1 tablet, Oral, Every 12 hours    lisinopriL (PRINIVIL,ZESTRIL) 20 mg, Oral, Daily    lovastatin (MEVACOR) 40 mg, Oral, Daily    predniSONE (DELTASONE) 40 mg, Oral, Daily    sildenafil (REVATIO) 20 mg Tab See Instructions, Take 1 to 5 tabs oral 1 hour prior to sexual activity, # 30 tab(s), 11 Refill(s), Pharmacy: [facility]/pharmacy #[id number], 175, cm, Height/Length Dosing, 12/23/21 9:23:00 CST, 96.6, kg, Weight Dosing, 12/23/21 9:23:00 CST    venlafaxine (EFFEXOR-XR) 75 mg, Oral, Daily     Past Surgical History:   Procedure Laterality Date    TONSILLECTOMY  2018    UVULOPALATOPHARYNGOPLASTY  2019    Dr. Anali Laguna     Family History   Problem Relation Age of Onset    Diverticulitis Mother     Diabetes Father     Heart attack Father 50    Hypertension Father     Stroke Father 60    No Known Problems Sister     Emphysema Maternal Grandmother     Stroke Maternal Grandmother        Social History     Tobacco Use    Smoking status: Former     Types: Cigarettes    Smokeless tobacco: Never   Substance Use Topics    Alcohol use: Not Currently      "Comment: Occasionally    Drug use: Never        Physical Exam      Vital Signs Reviewed   BP (!) 147/79   Pulse 108   Temp 98.3 °F (36.8 °C)   Resp 18   Ht 5' 10" (1.778 m)   Wt 96.6 kg (213 lb)   SpO2 100%   BMI 30.56 kg/m²        Procedures    Procedures     Labs     Results for orders placed or performed in visit on 07/11/23   Comprehensive Metabolic Panel   Result Value Ref Range    Sodium Level 143 136 - 145 mmol/L    Potassium Level 4.5 3.5 - 5.1 mmol/L    Chloride 106 mmol/L    Carbon Dioxide 30 21 - 32 mmol/L    Glucose Level 94 74 - 106 mg/dL    Blood Urea Nitrogen 17.0 7.0 - 18.0 mg/dL    Creatinine 0.91 0.60 - 1.30 mg/dL    Calcium Level Total 9.5 8.5 - 10.1 mg/dL    Protein Total 6.5 6.4 - 8.2 gm/dL    Albumin Level 4.5 3.4 - 5.0 g/dL    Globulin 2.0 1.2 - 3.0 gm/dL    Albumin/Globulin Ratio 2.3 (H) 1.5 - 2.0 ratio    Bilirubin Total 0.6 0.2 - 1.0 mg/dL    Alkaline Phosphatase 58 38 - 126 unit/L    Alanine Aminotransferase 27 7 - 52 unit/L    Aspartate Aminotransferase 15 15 - 37 unit/L    eGFR >60 mls/min/1.73/m2   Bilirubin, Direct   Result Value Ref Range    Bilirubin Direct 0.2 0.0 - 0.5 mg/dL   Lipid Panel   Result Value Ref Range    Cholesterol Total 159 0 - 200 mg/dL    HDL Cholesterol 36 35 - 60 mg/dL    Triglyceride 164 (H) 30 - 150 mg/dL    LDL Cholesterol Direct 92.0 <=129.0 mg/dL   PSA, Screening   Result Value Ref Range    Prostate Specific Antigen 0.55 0.00 - 2.50 ng/mL   Vitamin D   Result Value Ref Range    Vit D 25 OH 19.1 (L) 30.0 - 80.0 ng/mL   CBC with Differential   Result Value Ref Range    WBC 6.50 4.50 - 11.50 x10(3)/mcL    RBC 4.99 4.70 - 6.10 x10(6)/mcL    Hgb 14.5 14.0 - 18.0 g/dL    Hct 43.6 42.0 - 52.0 %    MCV 87.4 80.0 - 94.0 fL    MCH 29.1 27.0 - 31.0 pg    MCHC 33.3 33.0 - 36.0 g/dL    RDW 12.0 11.5 - 17.0 %    Platelet 305 130 - 400 x10(3)/mcL    MPV 10.4 7.4 - 10.4 fL    Neut % 53.1 %    Lymph % 39.1 %    Mono % 7.8 %    Lymph # 2.5 0.6 - 4.6 x10(3)/mcL    Neut # " 3.5 2.1 - 9.2 x10(3)/mcL    Mono # 0.5 0.1 - 1.3 x10(3)/mcL         Assessment:     1. Sore throat    2. Dysfunction of Eustachian tube, unspecified laterality    3. Otalgia, unspecified laterality        Plan:   Medications sent to pharmacy  Start antibiotics today  Start oral steroids tomorrow  Start taking an allergy pill daily such as claritin, zyrtec, allegrea or xyzal. Also start using a nasal steroid spray such as flonase or nasacort daily.  If oral steroids were prescribed, start them tomorrow morning. Monitor for fever. Take tylenol/acetaminophen or ibuprofen as needed. Rest and hydrate. If symptoms persist or worsen, return to clinic or seek medical attention immediately.       Sore throat    Dysfunction of Eustachian tube, unspecified laterality    Otalgia, unspecified laterality    Other orders  -     dexAMETHasone injection 10 mg  -     predniSONE (DELTASONE) 20 MG tablet; Take 2 tablets (40 mg total) by mouth once daily. for 4 days  Dispense: 8 tablet; Refill: 0  -     amoxicillin-clavulanate 875-125mg (AUGMENTIN) 875-125 mg per tablet; Take 1 tablet by mouth every 12 (twelve) hours. for 7 days  Dispense: 14 tablet; Refill: 0

## 2025-02-03 ENCOUNTER — OFFICE VISIT (OUTPATIENT)
Dept: URGENT CARE | Facility: CLINIC | Age: 45
End: 2025-02-03
Payer: COMMERCIAL

## 2025-02-03 VITALS
WEIGHT: 218 LBS | HEART RATE: 107 BPM | HEIGHT: 70 IN | DIASTOLIC BLOOD PRESSURE: 92 MMHG | BODY MASS INDEX: 31.21 KG/M2 | TEMPERATURE: 99 F | OXYGEN SATURATION: 97 % | SYSTOLIC BLOOD PRESSURE: 160 MMHG | RESPIRATION RATE: 18 BRPM

## 2025-02-03 DIAGNOSIS — J06.9 ACUTE URI: ICD-10-CM

## 2025-02-03 DIAGNOSIS — R05.9 COUGH, UNSPECIFIED TYPE: Primary | ICD-10-CM

## 2025-02-03 LAB
CTP QC/QA: YES
CTP QC/QA: YES
POC MOLECULAR INFLUENZA A AGN: NEGATIVE
POC MOLECULAR INFLUENZA B AGN: NEGATIVE
SARS-COV-2 AG RESP QL IA.RAPID: NEGATIVE

## 2025-02-03 PROCEDURE — 87502 INFLUENZA DNA AMP PROBE: CPT | Mod: QW,,, | Performed by: PHYSICIAN ASSISTANT

## 2025-02-03 PROCEDURE — 99213 OFFICE O/P EST LOW 20 MIN: CPT | Mod: 25,,, | Performed by: PHYSICIAN ASSISTANT

## 2025-02-03 PROCEDURE — 96372 THER/PROPH/DIAG INJ SC/IM: CPT | Mod: ,,, | Performed by: PHYSICIAN ASSISTANT

## 2025-02-03 PROCEDURE — 87811 SARS-COV-2 COVID19 W/OPTIC: CPT | Mod: QW,,, | Performed by: PHYSICIAN ASSISTANT

## 2025-02-03 RX ORDER — BETAMETHASONE SODIUM PHOSPHATE AND BETAMETHASONE ACETATE 3; 3 MG/ML; MG/ML
9 INJECTION, SUSPENSION INTRA-ARTICULAR; INTRALESIONAL; INTRAMUSCULAR; SOFT TISSUE
Status: COMPLETED | OUTPATIENT
Start: 2025-02-03 | End: 2025-02-03

## 2025-02-03 RX ORDER — BALOXAVIR MARBOXIL 40 MG/1
80 TABLET, FILM COATED ORAL ONCE
Qty: 2 TABLET | Refills: 0 | Status: SHIPPED | OUTPATIENT
Start: 2025-02-03 | End: 2025-02-03

## 2025-02-03 RX ORDER — PROMETHAZINE HYDROCHLORIDE AND DEXTROMETHORPHAN HYDROBROMIDE 6.25; 15 MG/5ML; MG/5ML
5 SYRUP ORAL EVERY 8 HOURS PRN
Qty: 118 ML | Refills: 0 | Status: SHIPPED | OUTPATIENT
Start: 2025-02-03 | End: 2025-02-08

## 2025-02-03 RX ORDER — OSELTAMIVIR PHOSPHATE 75 MG/1
75 CAPSULE ORAL 2 TIMES DAILY
Qty: 10 CAPSULE | Refills: 0 | Status: SHIPPED | OUTPATIENT
Start: 2025-02-03 | End: 2025-02-08

## 2025-02-03 RX ADMIN — BETAMETHASONE SODIUM PHOSPHATE AND BETAMETHASONE ACETATE 9 MG: 3; 3 INJECTION, SUSPENSION INTRA-ARTICULAR; INTRALESIONAL; INTRAMUSCULAR; SOFT TISSUE at 11:02

## 2025-02-03 NOTE — PROGRESS NOTES
"Subjective:      Patient ID: Nilson Crews is a 44 y.o. male.    Vitals:  height is 5' 10" (1.778 m) and weight is 98.9 kg (218 lb). His temperature is 98.9 °F (37.2 °C). His blood pressure is 160/92 (abnormal) and his pulse is 107. His respiration is 18 and oxygen saturation is 97%.     Chief Complaint: Cough    Male reports in the last 48 hours having chills body aches sore throat cough and congestion not sleeping well last night taking OTC presents to urgent care today for initial evaluation.    Cough  This is a new problem. Associated symptoms include chills, headaches, myalgias, postnasal drip and a sore throat. Pertinent negatives include no ear pain, fever, shortness of breath or wheezing.     Constitution: Positive for chills and fatigue. Negative for fever.   HENT:  Positive for congestion, postnasal drip and sore throat. Negative for ear pain, sinus pain, trouble swallowing and voice change.    Neck: Negative for neck pain and neck stiffness.   Cardiovascular: Negative.    Respiratory:  Positive for cough. Negative for shortness of breath and wheezing.    Gastrointestinal:  Negative for vomiting and diarrhea.   Musculoskeletal:  Positive for muscle ache.   Skin: Negative.    Allergic/Immunologic: Negative.    Neurological:  Positive for headaches. Negative for dizziness and passing out.      Objective:     Physical Exam   Constitutional: He is oriented to person, place, and time. He appears well-developed. He is cooperative.  Non-toxic appearance.      Comments:Awake alert pleasant ambulatory nasally congested male speaks in complete sentences     HENT:   Head: Normocephalic.   Ears:   Right Ear: Hearing, tympanic membrane, external ear and ear canal normal. Tympanic membrane is not erythematous and not bulging.   Left Ear: Hearing, tympanic membrane, external ear and ear canal normal. Tympanic membrane is not erythematous and not bulging.   Nose: Mucosal edema and congestion present. No rhinorrhea, " purulent discharge or nasal deformity. No epistaxis. Right sinus exhibits no maxillary sinus tenderness and no frontal sinus tenderness. Left sinus exhibits no maxillary sinus tenderness and no frontal sinus tenderness.   Mouth/Throat: Uvula is midline, oropharynx is clear and moist and mucous membranes are normal. Mucous membranes are moist. No trismus in the jaw. Normal dentition. No uvula swelling. No oropharyngeal exudate, posterior oropharyngeal edema or posterior oropharyngeal erythema.   Eyes: Conjunctivae and lids are normal. No scleral icterus.   Neck: Trachea normal and phonation normal. Neck supple. No edema present. No erythema present. No neck rigidity present.   Cardiovascular: Regular rhythm, normal heart sounds and normal pulses. Tachycardia present.   No murmur heard.Exam reveals no gallop.   Pulmonary/Chest: Effort normal and breath sounds normal. No stridor. No respiratory distress. He has no decreased breath sounds. He has no wheezes. He has no rhonchi. He has no rales.         Comments: CTA bilaterally    Musculoskeletal: Normal range of motion.         General: No swelling. Normal range of motion.      Cervical back: He exhibits no tenderness.   Lymphadenopathy:     He has no cervical adenopathy.   Neurological: no focal deficit. He is alert and oriented to person, place, and time. He exhibits normal muscle tone. Coordination normal.   Skin: Skin is warm, dry, intact, not diaphoretic, not pale and no rash.   Psychiatric: His speech is normal and behavior is normal. Judgment and thought content normal.   Nursing note and vitals reviewed.       Previous History      Review of patient's allergies indicates:  No Known Allergies    Past Medical History:   Diagnosis Date    Bipolar disorder, unspecified     Depression     Essential (primary) hypertension     Fatigue     Hypercholesterolemia     Low testosterone in male     Male erectile dysfunction, unspecified     Obesity, unspecified      "Obstructive sleep apnea     Skin tag     Vitamin D deficiency      Current Outpatient Medications   Medication Instructions    amLODIPine (NORVASC) 5 mg, Oral, Daily    lovastatin (MEVACOR) 40 mg, Oral, Daily    oseltamivir (TAMIFLU) 75 mg, Oral, 2 times daily    promethazine-dextromethorphan (PROMETHAZINE-DM) 6.25-15 mg/5 mL Syrp 5 mLs, Oral, Every 8 hours PRN    sildenafil (REVATIO) 20 mg Tab See Instructions, Take 1 to 5 tabs oral 1 hour prior to sexual activity, # 30 tab(s), 11 Refill(s), Pharmacy: [facility]/pharmacy #[id number], 175, cm, Height/Length Dosing, 12/23/21 9:23:00 CST, 96.6, kg, Weight Dosing, 12/23/21 9:23:00 CST    valsartan (DIOVAN) 320 mg, Oral, Daily    venlafaxine (EFFEXOR-XR) 75 mg, Oral, Daily    XOFLUZA 80 mg, Oral, Once     Past Surgical History:   Procedure Laterality Date    TONSILLECTOMY  2018    UVULOPALATOPHARYNGOPLASTY  2019    Dr. Anali Laguna     Family History   Problem Relation Name Age of Onset    Diverticulitis Mother      Diabetes Father Dane Crews     Heart attack Father Dane Crews 50    Hypertension Father Dane Crews     Stroke Father Dane Crews 60    No Known Problems Sister      Emphysema Maternal Grandmother Cristal Crews     Stroke Maternal Grandmother Cristal Crews        Social History     Tobacco Use    Smoking status: Former     Types: Cigarettes     Passive exposure: Never    Smokeless tobacco: Never   Substance Use Topics    Alcohol use: Not Currently     Comment: Occasionally    Drug use: Never        Physical Exam      Vital Signs Reviewed   BP (!) 160/92   Pulse 107   Temp 98.9 °F (37.2 °C)   Resp 18   Ht 5' 10" (1.778 m)   Wt 98.9 kg (218 lb)   SpO2 97%   BMI 31.28 kg/m²        Procedures    Procedures     Labs     Results for orders placed or performed in visit on 02/03/25   POCT Influenza A/B Molecular    Collection Time: 02/03/25 10:28 AM   Result Value Ref Range    POC Molecular Influenza A Ag Negative Negative    POC Molecular Influenza B Ag Negative " Negative     Acceptable Yes    SARS Coronavirus 2 Antigen, POCT Manual Read    Collection Time: 02/03/25 10:28 AM   Result Value Ref Range    SARS Coronavirus 2 Antigen Negative Negative     Acceptable Yes          Assessment:     1. Cough, unspecified type    2. Acute URI        Plan:   Patient A&O x4 with no respiratory distress in clinic.  Discuss with patient concern for suspected viral URI though negative testing concern for flu-like symptoms discharge plan with symptomatic Rx in addition to OTC fluids monitoring and potential repeat testing if desire.  Patient verbalized understanding in his ready for discharge now may re-contact urgent care in the next week if symptoms worsen or secondary infection develops.    High concern for viral upper respiratory illness today.  Negative COVID, negative influenza testing today possible too early testing.    If concern for flu-like symptoms may start Zofran or Tamiflu.  Cover cough at all times washing sanitized hands and surfaces regularly.  Slow to rise and stand to avoid lightheadedness dizziness or syncope.  Recommend rotating Tylenol and ibuprofen every 4-6 hours if needed for aches pains fever or chills.  Recommend daily non sedating antihistamine Claritin Zyrtec or loratadine over the next 1-2 weeks with Benadryl nightly if needed for severe nasal allergies.  Recommend Coricidin decongestant over the next week if needed for nasal sinus congestion and sinus pressure in addition to steroid injection received today.  Recommend 3 day limited Afrin or Sander-Synephrine nasal spray if not improving.  Phenergan DM sparingly lowest dose if needed for severe cough cold congestion body aches and rest.  Do not take sedating medication drive or operate machinery.    Recommend follow-up with primary care physician in 1-2 weeks for re-evaluation if not improving.  Recommended emergency department evaluation immediately if respiratory symptoms  worsen.  Cough, unspecified type  -     POCT Influenza A/B Molecular  -     SARS Coronavirus 2 Antigen, POCT Manual Read    Acute URI    Other orders  -     betamethasone acetate-betamethasone sodium phosphate injection 9 mg  -     promethazine-dextromethorphan (PROMETHAZINE-DM) 6.25-15 mg/5 mL Syrp; Take 5 mLs by mouth every 8 (eight) hours as needed (cough).  Dispense: 118 mL; Refill: 0  -     oseltamivir (TAMIFLU) 75 MG capsule; Take 1 capsule (75 mg total) by mouth 2 (two) times daily. for 5 days  Dispense: 10 capsule; Refill: 0  -     baloxavir marboxiL (XOFLUZA) 40 mg tablet; Take 2 tablets (80 mg total) by mouth once. for 1 dose  Dispense: 2 tablet; Refill: 0

## 2025-02-03 NOTE — PATIENT INSTRUCTIONS
High concern for viral upper respiratory illness today.  Negative COVID, negative influenza testing today possible too early testing.    If concern for flu-like symptoms may start Zofran or Tamiflu.  Cover cough at all times washing sanitized hands and surfaces regularly.  Slow to rise and stand to avoid lightheadedness dizziness or syncope.  Recommend rotating Tylenol and ibuprofen every 4-6 hours if needed for aches pains fever or chills.  Recommend daily non sedating antihistamine Claritin Zyrtec or loratadine over the next 1-2 weeks with Benadryl nightly if needed for severe nasal allergies.  Recommend Coricidin decongestant over the next week if needed for nasal sinus congestion and sinus pressure in addition to steroid injection received today.  Recommend 3 day limited Afrin or Sander-Synephrine nasal spray if not improving.  Phenergan DM sparingly lowest dose if needed for severe cough cold congestion body aches and rest.  Do not take sedating medication drive or operate machinery.    Recommend follow-up with primary care physician in 1-2 weeks for re-evaluation if not improving.  Recommended emergency department evaluation immediately if respiratory symptoms worsen.